# Patient Record
(demographics unavailable — no encounter records)

---

## 2024-11-18 NOTE — ASSESSMENT
[FreeTextEntry1] : Patient's DEXA scan this year is somewhat improved particularly at the neck of the femur she is interested though in going on Prolia instead of taking weekly pills she will be referred to rheumatology for further evaluation she also complains that her urine is at times dark recently she has no urinary tract symptoms we have reassured her that this is most likely a concentrated urine however routine laboratory and a urine analysis will be performed patient is is unable to give us a urine today follow-up after results available to us

## 2024-11-18 NOTE — HISTORY OF PRESENT ILLNESS
[FreeTextEntry1] : Osteoporosis and dark urine [de-identified] : Patient is here today to discuss her recent DEXA scan and her treatment for osteoporosis she also says she has noted her urine to be somewhat dark recently she has no urinary tract symptomatology whatsoever and feels well no fever chills fatigue etc.

## 2024-12-30 NOTE — REVIEW OF SYSTEMS
[Dysuria] : no dysuria [Incontinence] : incontinence [Hematuria] : no hematuria [Frequency] : no frequency [Back Pain] : no back pain [Negative] : Gastrointestinal [FreeTextEntry8] : + urgency

## 2024-12-30 NOTE — PHYSICAL EXAM
[Normal Sclera/Conjunctiva] : normal sclera/conjunctiva [Normal Outer Ear/Nose] : the outer ears and nose were normal in appearance [Normal] : no respiratory distress, lungs were clear to auscultation bilaterally and no accessory muscle use [Soft] : abdomen soft [Non Tender] : non-tender [Non-distended] : non-distended [No Masses] : no abdominal mass palpated [No CVA Tenderness] : no CVA  tenderness

## 2024-12-30 NOTE — ASSESSMENT
[FreeTextEntry1] : # Microscopic hematuria f/u UA w/ reflex Ucx Have low suspicion of UTI - no new symptoms recently Referred to urology  # Prediabetes Stable, with slight increase from previous Avoid excess sugars, carbs, alcohol in diet  # HLD Cont statin LFTs were normal  f/u in June 2025 for formal CPE

## 2024-12-30 NOTE — HISTORY OF PRESENT ILLNESS
[FreeTextEntry1] : f/u hematuria [de-identified] : Pt comes in for f/u abnormal urine. Some second hand smoke exposure in her childhood from her father and her  was a light smoker who didn't usually smoke around her, she is a non-smoker. She is sometimes having dark colored urine. No new or worsening back pain (has some back pain at baseline). No dysuria. She does have frequency, has had in last 2 years. She has some difficulty holding her urine for past 4-5 months.   Pt had cold within last few weeks and wants her lungs checked.

## 2025-01-08 NOTE — ASSESSMENT
[FreeTextEntry1] : 75F with history of microscopic hematuria.   -UA history reviewed -UA/UCx today -Reduce bladder irritants -Void fluid intake 2 to 3 hours prior to bedtime -CT Urogram -RTC for cystoscopy in 1 month  I had an extensive discussion with the patient regarding hematuria.  We discussed the 2020 AUA Microscopic Hematuria Guidelines, which stratify patients into low-, intermediate-, and high-risk based on factors such as # of RBCs on microscopic analysis, age, gender, smoking history, and additional urothelial cancer risk factors.  For low-risk patients, repeat UA in 6 months vs renal US + cysto are recommended.  For intermediate-risk patients, renal US + cysto are recommended.  For high-risk patients, CT Urogram + cysto are recommended.  We discussed evaluation for non-malignant or gynecologic sources of the microscopic hematuria which includes, but is not limited to, kidney/bladder stones, UTIs, vaginal atrophy, and medical renal disease.  Per guidelines, patients w/ microscopic hematuria who are on antiplatelet agents or anticoagulant medication should still be evaluated in the same manner as those who are not on such medications.  Risk and benefits of evaluation were discussed.  All of the patient's questions and concerns were addressed.  Based on the 2020 AUA guidelines, this patient falls under HIGH risk based on age.         Hanane Patiño MD Chief of Urology, [address], [facility] Entrance [address] [place] [address] Phone: [phone] Fax: [phone]

## 2025-01-08 NOTE — HISTORY OF PRESENT ILLNESS
[Currently Experiencing ___] :  [unfilled] [Urinary Urgency] : urinary urgency [Nocturia] : nocturia [Hematuria - Microscopic] : microscopic hematuria [None] : None [FreeTextEntry1] : Referring Provider: Dr. Green Chief Complaint: Microscopic hematuria Date of first visit: 01/08/2025 Occupation:    HILARIO MEJIA is a 75 year old Romansh woman with a PMHx of HLD, L AFP, lumbar disc herniation, osteoporosis who presents for evaluation of microscopic hematuria.  Patient states she has a longstanding history of microscopic hematuria with correlating negative urine cultures.  She denies history of gross hematuria, UTI, kidney stones or family history of  CA.  Denies smoking history, however reports her  has a history of smoking and was diagnosed with bladder cancer.  Denies vaginal bleeding.  Patient does endorse urinary urgency and nocturia x 2.  States she drinks 2 cups of coffee per day.  Endorses to drink fluids up until time of bed.  She endorses no other urinary complaints.  UA Hx 12/30/24 2 RBCs 12/23/24 11 RBCs 6/28/2024 4 RBCs 6/24/23 10 RBCs    PMHx:  HLD, L AFP, lumbar disc herniation, osteoporosis  SxHx: Total abdominal hysterectomy FHx: No FHx of  CA SocHx: Never smoker, possible secondhand smoke exposure Allergies: NKDA   The patient denies fevers, chills, nausea and or vomiting and no unexplained weight loss. All pertinent laboratory, films and physician notes were reviewed.

## 2025-01-31 NOTE — HISTORY OF PRESENT ILLNESS
[FreeTextEntry1] :     Referring Provider: Dr. Green Chief Complaint: Microscopic hematuria Date of first visit: 01/08/2025 Occupation:   HILARIO MEJIA is a 75 year old Serbian woman with a PMHx of HLD, L AFP, lumbar disc herniation, osteoporosis who presents for evaluation of microscopic hematuria. Patient states she has a longstanding history of microscopic hematuria with correlating negative urine cultures. She denies history of gross hematuria, UTI, kidney stones or family history of  CA. Denies smoking history, however reports her  has a history of smoking and was diagnosed with bladder cancer. Denies vaginal bleeding. Patient does endorse urinary urgency and nocturia x 2. States she drinks 2 cups of coffee per day. Endorses to drink fluids up until time of bed. She endorses no other urinary complaints.  UA Hx 12/30/24 2 RBCs 12/23/24 11 RBCs 6/28/2024 4 RBCs 6/24/23 10 RBCs   PMHx: HLD, L AFP, lumbar disc herniation, osteoporosis SxHx: Total abdominal hysterectomy FHx: No FHx of  CA SocHx: Never smoker, possible secondhand smoke exposure Allergies: NKDA  The patient denies fevers, chills, nausea and or vomiting and no unexplained weight loss. All pertinent laboratory, films and physician notes were reviewed.    Patient is currently experiencing  , urinary urgency, nocturia and microscopic hematuria.   Pain Level: None

## 2025-01-31 NOTE — ASSESSMENT
[FreeTextEntry1] : 75F with history of microscopic hematuria.  -UA history reviewed -UA/UCx today -Reduce bladder irritants -Void fluid intake 2 to 3 hours prior to bedtime -CT Urogram -RTC for cystoscopy in 1 month  I had an extensive discussion with the patient regarding hematuria. We discussed the 2020 AUA Microscopic Hematuria Guidelines, which stratify patients into low-, intermediate-, and high-risk based on factors such as # of RBCs on microscopic analysis, age, gender, smoking history, and additional urothelial cancer risk factors. For low-risk patients, repeat UA in 6 months vs renal US + cysto are recommended. For intermediate-risk patients, renal US + cysto are recommended. For high-risk patients, CT Urogram + cysto are recommended. We discussed evaluation for non-malignant or gynecologic sources of the microscopic hematuria which includes, but is not limited to, kidney/bladder stones, UTIs, vaginal atrophy, and medical renal disease. Per guidelines, patients w/ microscopic hematuria who are on antiplatelet agents or anticoagulant medication should still be evaluated in the same manner as those who are not on such medications. Risk and benefits of evaluation were discussed. All of the patient's questions and concerns were addressed. Based on the 2020 AUA guidelines, this patient falls under HIGH risk based on age.     Hanane Patiño MD Chief of Urology, [address], [facility] Entrance [address] [place] [address] Phone: [phone] Fax: [phone].

## 2025-01-31 NOTE — HISTORY OF PRESENT ILLNESS
[FreeTextEntry1] :     Referring Provider: Dr. Green Chief Complaint: Microscopic hematuria Date of first visit: 01/08/2025 Occupation:   HILARIO MEJIA is a 75 year old Telugu woman with a PMHx of HLD, L AFP, lumbar disc herniation, osteoporosis who presents for evaluation of microscopic hematuria. Patient states she has a longstanding history of microscopic hematuria with correlating negative urine cultures. She denies history of gross hematuria, UTI, kidney stones or family history of  CA. Denies smoking history, however reports her  has a history of smoking and was diagnosed with bladder cancer. Denies vaginal bleeding. Patient does endorse urinary urgency and nocturia x 2. States she drinks 2 cups of coffee per day. Endorses to drink fluids up until time of bed. She endorses no other urinary complaints.  UA Hx 12/30/24 2 RBCs 12/23/24 11 RBCs 6/28/2024 4 RBCs 6/24/23 10 RBCs   PMHx: HLD, L AFP, lumbar disc herniation, osteoporosis SxHx: Total abdominal hysterectomy FHx: No FHx of  CA SocHx: Never smoker, possible secondhand smoke exposure Allergies: NKDA  The patient denies fevers, chills, nausea and or vomiting and no unexplained weight loss. All pertinent laboratory, films and physician notes were reviewed.    Patient is currently experiencing  , urinary urgency, nocturia and microscopic hematuria.   Pain Level: None

## 2025-01-31 NOTE — HISTORY OF PRESENT ILLNESS
[FreeTextEntry1] :     Referring Provider: Dr. Green Chief Complaint: Microscopic hematuria Date of first visit: 01/08/2025 Occupation:   HILARIO MEJIA is a 75 year old Estonian woman with a PMHx of HLD, L AFP, lumbar disc herniation, osteoporosis who presents for evaluation of microscopic hematuria. Patient states she has a longstanding history of microscopic hematuria with correlating negative urine cultures. She denies history of gross hematuria, UTI, kidney stones or family history of  CA. Denies smoking history, however reports her  has a history of smoking and was diagnosed with bladder cancer. Denies vaginal bleeding. Patient does endorse urinary urgency and nocturia x 2. States she drinks 2 cups of coffee per day. Endorses to drink fluids up until time of bed. She endorses no other urinary complaints.  UA Hx 12/30/24 2 RBCs 12/23/24 11 RBCs 6/28/2024 4 RBCs 6/24/23 10 RBCs   PMHx: HLD, L AFP, lumbar disc herniation, osteoporosis SxHx: Total abdominal hysterectomy FHx: No FHx of  CA SocHx: Never smoker, possible secondhand smoke exposure Allergies: NKDA  The patient denies fevers, chills, nausea and or vomiting and no unexplained weight loss. All pertinent laboratory, films and physician notes were reviewed.    Patient is currently experiencing  , urinary urgency, nocturia and microscopic hematuria.   Pain Level: None

## 2025-01-31 NOTE — ASSESSMENT
[FreeTextEntry1] : 75F with history of microscopic hematuria.  -UA history reviewed -UA/UCx today -Reduce bladder irritants -Void fluid intake 2 to 3 hours prior to bedtime -CT Urogram -RTC for cystoscopy in 1 month  I had an extensive discussion with the patient regarding hematuria. We discussed the 2020 AUA Microscopic Hematuria Guidelines, which stratify patients into low-, intermediate-, and high-risk based on factors such as # of RBCs on microscopic analysis, age, gender, smoking history, and additional urothelial cancer risk factors. For low-risk patients, repeat UA in 6 months vs renal US + cysto are recommended. For intermediate-risk patients, renal US + cysto are recommended. For high-risk patients, CT Urogram + cysto are recommended. We discussed evaluation for non-malignant or gynecologic sources of the microscopic hematuria which includes, but is not limited to, kidney/bladder stones, UTIs, vaginal atrophy, and medical renal disease. Per guidelines, patients w/ microscopic hematuria who are on antiplatelet agents or anticoagulant medication should still be evaluated in the same manner as those who are not on such medications. Risk and benefits of evaluation were discussed. All of the patient's questions and concerns were addressed. Based on the 2020 AUA guidelines, this patient falls under HIGH risk based on age.     Hanane Patiño MD Chief of Urology, [address], [facility] Entrance [address] [place] [address] Phone: [phone] Fax: [phone].

## 2025-01-31 NOTE — ASSESSMENT
[FreeTextEntry1] : 75F with history of microscopic hematuria.  -UA history reviewed -UA/UCx today -Reduce bladder irritants -Void fluid intake 2 to 3 hours prior to bedtime -CT Urogram -RTC for cystoscopy in 1 month  I had an extensive discussion with the patient regarding hematuria. We discussed the 2020 AUA Microscopic Hematuria Guidelines, which stratify patients into low-, intermediate-, and high-risk based on factors such as # of RBCs on microscopic analysis, age, gender, smoking history, and additional urothelial cancer risk factors. For low-risk patients, repeat UA in 6 months vs renal US + cysto are recommended. For intermediate-risk patients, renal US + cysto are recommended. For high-risk patients, CT Urogram + cysto are recommended. We discussed evaluation for non-malignant or gynecologic sources of the microscopic hematuria which includes, but is not limited to, kidney/bladder stones, UTIs, vaginal atrophy, and medical renal disease. Per guidelines, patients w/ microscopic hematuria who are on antiplatelet agents or anticoagulant medication should still be evaluated in the same manner as those who are not on such medications. Risk and benefits of evaluation were discussed. All of the patient's questions and concerns were addressed. Based on the 2020 AUA guidelines, this patient falls under HIGH risk based on age.     Hanane Patiño MD Chief of Urology, [address], [facility] Entrance [address] [place] [address] Phone: [phone] Fax: [phone].

## 2025-06-13 NOTE — HEALTH RISK ASSESSMENT
[Good] : ~his/her~  mood as  good [Monthly or less (1 pt)] : Monthly or less (1 point) [1 or 2 (0 pts)] : 1 or 2 (0 points) [Never (0 pts)] : Never (0 points) [No] : In the past 12 months have you used drugs other than those required for medical reasons? No [No falls in past year] : Patient reported no falls in the past year [0] : 2) Feeling down, depressed, or hopeless: Not at all (0) [PHQ-2 Negative - No further assessment needed] : PHQ-2 Negative - No further assessment needed [Yes] : Reviewed medication list for presence of high-risk medications. [Opioids] : opioids [Never] : Never [NO] : No [Patient reported mammogram was abnormal] : Patient reported mammogram was abnormal [Patient declined PAP Smear] : Patient declined PAP Smear [Patient reported bone density results were abnormal] : Patient reported bone density results were abnormal [Patient reported colonoscopy was normal] : Patient reported colonoscopy was normal [HIV test declined] : HIV test declined [Hepatitis C test declined] : Hepatitis C test declined [None] : None [With Significant Other] : lives with significant other [Retired] : retired [High School] : high school [] :  [# Of Children ___] : has [unfilled] children [Feels Safe at Home] : Feels safe at home [Fully functional (bathing, dressing, toileting, transferring, walking, feeding)] : Fully functional (bathing, dressing, toileting, transferring, walking, feeding) [Fully functional (using the telephone, shopping, preparing meals, housekeeping, doing laundry, using] : Fully functional and needs no help or supervision to perform IADLs (using the telephone, shopping, preparing meals, housekeeping, doing laundry, using transportation, managing medications and managing finances) [Reports changes in hearing] : Reports changes in hearing [Reports normal functional visual acuity (ie: able to read med bottle)] : Reports normal functional visual acuity [Smoke Detector] : smoke detector [Carbon Monoxide Detector] : carbon monoxide detector [Safety elements used in home] : safety elements used in home [Seat Belt] :  uses seat belt [Sunscreen] : uses sunscreen [Patient/Caregiver unclear of wishes] : , patient/caregiver unclear of wishes [de-identified] : ophthalmologist [Audit-CScore] : 1 [de-identified] : walks [de-identified] : see above [[facility]] : 7 [[id number]] : 0 [Change in mental status noted] : No change in mental status noted [Language] : denies difficulty with language [Behavior] : denies difficulty with behavior [Learning/Retaining New Information] : denies difficulty learning/retaining new information [Handling Complex Tasks] : denies difficulty handling complex tasks [Reasoning] : denies difficulty with reasoning [Spatial Ability and Orientation] : denies difficulty with spatial ability and orientation [Sexually Active] : not sexually active [High Risk Behavior] : no high risk behavior [Reports changes in vision] : Reports no changes in vision [Reports changes in dental health] : Reports no changes in dental health [Travel to Developing Areas] : does not  travel to developing areas [TB Exposure] : is not being exposed to tuberculosis [MammogramDate] : 08/23 [MammogramComments] : went for 6 month f/u mammo/US, normal [PapSmearComments] : s/p hysterectomy [BoneDensityDate] : 11/24 [BoneDensityComments] : osteoporosis [ColonoscopyDate] : 02/18 [FreeTextEntry2] :  [de-identified] : has hearing aids at home [FreeTextEntry4] : Pt doesn't want to be kept alive on machines if brain dead. [AdvancecareDate] : 06/25

## 2025-06-13 NOTE — HISTORY OF PRESENT ILLNESS
[FreeTextEntry1] : CPE [de-identified] : Pt comes in for CPE.  Breast nodule: seen on last routine mammo, had short term f/u US, BIRADS 2, rec'd resume annual mammo   Hx cholangitis: did not have cholecystectomy  Microscopic hematuria: went to uro, she had cystoscopy and everything was ok  HLD: taking simvastatin as prescribed, trying to stick to healthy diet  Osteoporosis: taking alendronate once weekly, wants to switch to Prolia if possible. She sometimes gets epigastric pain, is worried its from alendronate. It improves with some water. It lasts for 1/2 min at a time, very sharp pain. It happens every 2 months.  Pre DM: trying to eat healthy

## 2025-06-13 NOTE — PHYSICAL EXAM
[No Lymphadenopathy] : no lymphadenopathy [Supple] : supple [Normal] : normal rate, regular rhythm, normal S1 and S2 and no murmur heard [No Edema] : there was no peripheral edema [No Extremity Clubbing/Cyanosis] : no extremity clubbing/cyanosis [Normal Appearance] : normal in appearance [No Nipple Discharge] : no nipple discharge [No Axillary Lymphadenopathy] : no axillary lymphadenopathy [Soft] : abdomen soft [Non Tender] : non-tender [Non-distended] : non-distended [No Masses] : no abdominal mass palpated [Normal Supraclavicular Nodes] : no supraclavicular lymphadenopathy [Normal Anterior Cervical Nodes] : no anterior cervical lymphadenopathy [Normal Gait] : normal gait [Normal Affect] : the affect was normal [Alert and Oriented x3] : oriented to person, place, and time [Normal Insight/Judgement] : insight and judgment were intact [de-identified] : R ear 100% cerumen impaction, L TM normal [de-identified] : chaperone present

## 2025-06-13 NOTE — ASSESSMENT
[FreeTextEntry1] : # HM f/u AV labs And due for breast cancer screening, follow-up mammogram Patient not yet due for colonoscopy  #Microscopic hematuria Patient had workup with urology, normal  #Hyperlipidemia Continue simvastatin, tolerating well, will send renewal  #Osteoporosis Patient taking alendronate, having some GI symptoms Patient is interested in switching to Prolia, will follow-up vitamin D levels and reevaluate  #Prediabetes Follow-up A1c  #Tinea Patient can get clotrimazole cream over-the-counter Ciclopirox for right big toenail  #Cerumen impaction Trial of over-the-counter Debrox drops  Follow-up in 6 months or as needed   Visit conducted as part of ongoing, longitudinal medical care for patient's medical and other issues.  [Vaccines Reviewed] : Immunizations reviewed today. Please see immunization details in the vaccine log within the immunization flowsheet.

## 2025-06-13 NOTE — HEALTH RISK ASSESSMENT
[Good] : ~his/her~  mood as  good [Monthly or less (1 pt)] : Monthly or less (1 point) [1 or 2 (0 pts)] : 1 or 2 (0 points) [Never (0 pts)] : Never (0 points) [No] : In the past 12 months have you used drugs other than those required for medical reasons? No [No falls in past year] : Patient reported no falls in the past year [0] : 2) Feeling down, depressed, or hopeless: Not at all (0) [PHQ-2 Negative - No further assessment needed] : PHQ-2 Negative - No further assessment needed [Yes] : Reviewed medication list for presence of high-risk medications. [Opioids] : opioids [Never] : Never [NO] : No [Patient reported mammogram was abnormal] : Patient reported mammogram was abnormal [Patient declined PAP Smear] : Patient declined PAP Smear [Patient reported bone density results were abnormal] : Patient reported bone density results were abnormal [Patient reported colonoscopy was normal] : Patient reported colonoscopy was normal [HIV test declined] : HIV test declined [Hepatitis C test declined] : Hepatitis C test declined [None] : None [With Significant Other] : lives with significant other [Retired] : retired [High School] : high school [] :  [# Of Children ___] : has [unfilled] children [Feels Safe at Home] : Feels safe at home [Fully functional (bathing, dressing, toileting, transferring, walking, feeding)] : Fully functional (bathing, dressing, toileting, transferring, walking, feeding) [Fully functional (using the telephone, shopping, preparing meals, housekeeping, doing laundry, using] : Fully functional and needs no help or supervision to perform IADLs (using the telephone, shopping, preparing meals, housekeeping, doing laundry, using transportation, managing medications and managing finances) [Reports changes in hearing] : Reports changes in hearing [Reports normal functional visual acuity (ie: able to read med bottle)] : Reports normal functional visual acuity [Smoke Detector] : smoke detector [Carbon Monoxide Detector] : carbon monoxide detector [Safety elements used in home] : safety elements used in home [Seat Belt] :  uses seat belt [Sunscreen] : uses sunscreen [Patient/Caregiver unclear of wishes] : , patient/caregiver unclear of wishes [de-identified] : ophthalmologist [Audit-CScore] : 1 [de-identified] : walks [de-identified] : see above [[facility]] : 7 [[id number]] : 0 [Change in mental status noted] : No change in mental status noted [Language] : denies difficulty with language [Behavior] : denies difficulty with behavior [Learning/Retaining New Information] : denies difficulty learning/retaining new information [Handling Complex Tasks] : denies difficulty handling complex tasks [Reasoning] : denies difficulty with reasoning [Spatial Ability and Orientation] : denies difficulty with spatial ability and orientation [Sexually Active] : not sexually active [High Risk Behavior] : no high risk behavior [Reports changes in vision] : Reports no changes in vision [Reports changes in dental health] : Reports no changes in dental health [Travel to Developing Areas] : does not  travel to developing areas [TB Exposure] : is not being exposed to tuberculosis [MammogramDate] : 08/23 [MammogramComments] : went for 6 month f/u mammo/US, normal [PapSmearComments] : s/p hysterectomy [BoneDensityDate] : 11/24 [BoneDensityComments] : osteoporosis [ColonoscopyDate] : 02/18 [FreeTextEntry2] :  [de-identified] : has hearing aids at home [AdvancecareDate] : 06/25 [FreeTextEntry4] : Pt doesn't want to be kept alive on machines if brain dead.

## 2025-06-13 NOTE — HISTORY OF PRESENT ILLNESS
[FreeTextEntry1] : CPE [de-identified] : Pt comes in for CPE.  Breast nodule: seen on last routine mammo, had short term f/u US, BIRADS 2, rec'd resume annual mammo   Hx cholangitis: did not have cholecystectomy  Microscopic hematuria: went to uro, she had cystoscopy and everything was ok  HLD: taking simvastatin as prescribed, trying to stick to healthy diet  Osteoporosis: taking alendronate once weekly, wants to switch to Prolia if possible. She sometimes gets epigastric pain, is worried its from alendronate. It improves with some water. It lasts for 1/2 min at a time, very sharp pain. It happens every 2 months.  Pre DM: trying to eat healthy

## 2025-06-13 NOTE — PHYSICAL EXAM
[No Lymphadenopathy] : no lymphadenopathy [Supple] : supple [Normal] : normal rate, regular rhythm, normal S1 and S2 and no murmur heard [No Edema] : there was no peripheral edema [No Extremity Clubbing/Cyanosis] : no extremity clubbing/cyanosis [Normal Appearance] : normal in appearance [No Nipple Discharge] : no nipple discharge [No Axillary Lymphadenopathy] : no axillary lymphadenopathy [Soft] : abdomen soft [Non Tender] : non-tender [Non-distended] : non-distended [No Masses] : no abdominal mass palpated [Normal Supraclavicular Nodes] : no supraclavicular lymphadenopathy [Normal Anterior Cervical Nodes] : no anterior cervical lymphadenopathy [Normal Gait] : normal gait [Normal Affect] : the affect was normal [Alert and Oriented x3] : oriented to person, place, and time [Normal Insight/Judgement] : insight and judgment were intact [de-identified] : R ear 100% cerumen impaction, L TM normal [de-identified] : chaperone present